# Patient Record
Sex: FEMALE | HISPANIC OR LATINO | URBAN - METROPOLITAN AREA
[De-identification: names, ages, dates, MRNs, and addresses within clinical notes are randomized per-mention and may not be internally consistent; named-entity substitution may affect disease eponyms.]

---
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
## 2024-04-20 ENCOUNTER — RESULT CHARGE (OUTPATIENT)
Age: 19
End: 2024-04-20

## 2024-04-20 ENCOUNTER — NON-APPOINTMENT (OUTPATIENT)
Age: 19
End: 2024-04-20

## 2024-04-20 ENCOUNTER — APPOINTMENT (OUTPATIENT)
Dept: ORTHOPEDIC SURGERY | Facility: CLINIC | Age: 19
End: 2024-04-20
Payer: MEDICAID

## 2024-04-20 PROBLEM — Z00.00 ENCOUNTER FOR PREVENTIVE HEALTH EXAMINATION: Status: ACTIVE | Noted: 2024-04-20

## 2024-04-20 PROCEDURE — 73610 X-RAY EXAM OF ANKLE: CPT | Mod: RT

## 2024-04-20 PROCEDURE — 99203 OFFICE O/P NEW LOW 30 MIN: CPT | Mod: 25

## 2024-04-20 RX ORDER — DICLOFENAC SODIUM 75 MG/1
75 TABLET, DELAYED RELEASE ORAL
Qty: 60 | Refills: 0 | Status: ACTIVE | COMMUNITY
Start: 2024-04-20 | End: 1900-01-01

## 2024-04-20 NOTE — DISCUSSION/SUMMARY
[de-identified] : Impression: Injury to the right foot and ankle.  Plan: Patient was advised for compression, rest, ice and elevation. Weightbearing as tolerated. Patient was placed in a short cam walker boot, patient states that she has significant pain with ambulation, she describes the pain being on the ankle joint. At this time patient was given crutches for protected weightbearing. A note for school was given. Diclofenac 75 was sent to the pharmacy. Patient was advised to follow-up in 2 weeks for repeat evaluation if any increased pain or discomfort degrees physical.  Follow-up: 2 weeks.

## 2024-04-20 NOTE — IMAGING
[de-identified] : On examination of the right foot and ankle, there is significant swelling over the dorsal lateral aspect of the foot. Patient has no swelling over the ankle. Calf is soft, nontender. Nontender over the medial or lateral malleolus. Nontender over the deltoid ligament. Nontender anterior deltoid ligament. Nontender over the PT FL, ATFL, some discomfort over the CFL Patient has tenderness to palpation about the cuboid and there is also some swelling on that area. Patient is able to dorsiflex to neutral. Antalgic gait.  X-ray of the right ankle 3 views were taken, negative for any acute fracture or dislocation.

## 2024-04-20 NOTE — HISTORY OF PRESENT ILLNESS
[de-identified] : 18-year-old female here for an evaluation of injury sustained to the right ankle, patient states that on April 19, 2024 she was coming down the stairs, as she took the last step she twisted her ankle, since then she been having significant pain. Unable to apply weight. The pain at rest is 8/10, pain with activity is 10/10.

## 2024-05-09 ENCOUNTER — APPOINTMENT (OUTPATIENT)
Dept: ORTHOPEDIC SURGERY | Facility: CLINIC | Age: 19
End: 2024-05-09
Payer: MEDICAID

## 2024-05-09 DIAGNOSIS — S99.911A UNSPECIFIED INJURY OF RIGHT ANKLE, INITIAL ENCOUNTER: ICD-10-CM

## 2024-05-09 DIAGNOSIS — S99.921A UNSPECIFIED INJURY OF RIGHT ANKLE, INITIAL ENCOUNTER: ICD-10-CM

## 2024-05-09 PROCEDURE — 99213 OFFICE O/P EST LOW 20 MIN: CPT

## 2024-05-16 PROBLEM — S99.911A INJURY OF ANKLE AND FOOT, RIGHT, INITIAL ENCOUNTER: Status: ACTIVE | Noted: 2024-04-20

## 2024-05-16 NOTE — HISTORY OF PRESENT ILLNESS
[de-identified] : 18-year-old female is here today for follow-up of her right ankle sprain.  Injury happened about 3 weeks ago.  She is feeling much better.  She still has some mild pain but overall is doing well.  She denies any new injury or trauma.  She denies any numbness tingling or any calf pain.

## 2024-05-16 NOTE — DISCUSSION/SUMMARY
[de-identified] : At this time I discussed with her she no longer needs the boot.  She can ease back into activity as tolerated.  We will see her back as needed. Patient will call me if any other problems or concerns.  Patient verbalized understanding and agreed with the plan, all questions were answered in the office today.

## 2024-05-16 NOTE — IMAGING
[de-identified] : On examination of the right ankle she has mild swelling, no erythema, no ecchymosis.  No tenderness over the medial or lateral malleolus.  Mild tenderness over the ATFL and CFL.  No tenderness over the deltoid ligament.  Over the talar dome.  No tenderness over the Achilles to the calcaneus, negative Cuevas's test, no calf tenderness.  No tenderness to palpation of the foot.  She is able to dorsiflex and plantarflex, sensation is intact throughout, 2+ DP and PT pulses.